# Patient Record
Sex: MALE | Race: OTHER | HISPANIC OR LATINO | URBAN - METROPOLITAN AREA
[De-identification: names, ages, dates, MRNs, and addresses within clinical notes are randomized per-mention and may not be internally consistent; named-entity substitution may affect disease eponyms.]

---

## 2022-06-15 ENCOUNTER — HOSPITAL ENCOUNTER (EMERGENCY)
Facility: HOSPITAL | Age: 37
Discharge: HOME | End: 2022-06-15
Attending: STUDENT IN AN ORGANIZED HEALTH CARE EDUCATION/TRAINING PROGRAM

## 2022-06-15 ENCOUNTER — CLINICAL SUPPORT (OUTPATIENT)
Dept: OPHTHALMOLOGY | Facility: CLINIC | Age: 37
End: 2022-06-15
Attending: STUDENT IN AN ORGANIZED HEALTH CARE EDUCATION/TRAINING PROGRAM

## 2022-06-15 VITALS
HEART RATE: 64 BPM | RESPIRATION RATE: 16 BRPM | SYSTOLIC BLOOD PRESSURE: 127 MMHG | OXYGEN SATURATION: 100 % | DIASTOLIC BLOOD PRESSURE: 77 MMHG | TEMPERATURE: 98 F | HEIGHT: 65 IN

## 2022-06-15 VITALS — HEIGHT: 64 IN

## 2022-06-15 DIAGNOSIS — H20.9 TRAUMATIC IRITIS: ICD-10-CM

## 2022-06-15 DIAGNOSIS — T15.92XA FOREIGN BODY, EYE, LEFT, INITIAL ENCOUNTER: Primary | ICD-10-CM

## 2022-06-15 DIAGNOSIS — H11.003 PTERYGIUM EYE, BILATERAL: ICD-10-CM

## 2022-06-15 DIAGNOSIS — T15.02XA FOREIGN BODY OF LEFT CORNEA, INITIAL ENCOUNTER: Primary | ICD-10-CM

## 2022-06-15 PROCEDURE — 99284 EMERGENCY DEPT VISIT MOD MDM: CPT | Mod: 27

## 2022-06-15 PROCEDURE — 63600175 PHARM REV CODE 636 W HCPCS: Performed by: PHYSICIAN ASSISTANT

## 2022-06-15 PROCEDURE — 90715 TDAP VACCINE 7 YRS/> IM: CPT | Performed by: PHYSICIAN ASSISTANT

## 2022-06-15 PROCEDURE — 25000003 PHARM REV CODE 250: Performed by: STUDENT IN AN ORGANIZED HEALTH CARE EDUCATION/TRAINING PROGRAM

## 2022-06-15 PROCEDURE — 99213 OFFICE O/P EST LOW 20 MIN: CPT | Mod: PBBFAC | Performed by: STUDENT IN AN ORGANIZED HEALTH CARE EDUCATION/TRAINING PROGRAM

## 2022-06-15 PROCEDURE — 65222 REMOVE FOREIGN BODY FROM EYE: CPT | Mod: PBBFAC | Performed by: STUDENT IN AN ORGANIZED HEALTH CARE EDUCATION/TRAINING PROGRAM

## 2022-06-15 PROCEDURE — 90471 IMMUNIZATION ADMIN: CPT | Performed by: PHYSICIAN ASSISTANT

## 2022-06-15 RX ORDER — TETRACAINE HYDROCHLORIDE 5 MG/ML
2 SOLUTION OPHTHALMIC
Status: DISCONTINUED | OUTPATIENT
Start: 2022-06-15 | End: 2022-06-15 | Stop reason: HOSPADM

## 2022-06-15 RX ORDER — ATROPINE SULFATE 10 MG/ML
1 SOLUTION/ DROPS OPHTHALMIC 2 TIMES DAILY
Qty: 2 ML | Refills: 0 | Status: SHIPPED | OUTPATIENT
Start: 2022-06-15 | End: 2022-06-22

## 2022-06-15 RX ORDER — ERYTHROMYCIN 5 MG/G
OINTMENT OPHTHALMIC NIGHTLY
Qty: 3.5 G | Refills: 0 | Status: SHIPPED | OUTPATIENT
Start: 2022-06-15 | End: 2022-06-22

## 2022-06-15 RX ORDER — MOXIFLOXACIN 5 MG/ML
1 SOLUTION/ DROPS OPHTHALMIC 3 TIMES DAILY
Qty: 3 ML | Refills: 0 | Status: SHIPPED | OUTPATIENT
Start: 2022-06-15 | End: 2022-06-22

## 2022-06-15 RX ADMIN — TETANUS TOXOID, REDUCED DIPHTHERIA TOXOID AND ACELLULAR PERTUSSIS VACCINE, ADSORBED 0.5 ML: 5; 2.5; 8; 8; 2.5 SUSPENSION INTRAMUSCULAR at 10:06

## 2022-06-15 NOTE — PROGRESS NOTES
Assessment/Plan:  1. Corneal foreign body, OS  - 0.9 x 0.9 mm foreign body in central visual axis  - Informed consent signed, removed at slit lamp but still with residual rust ring  - Start vigamox 4x daily in left eye only  - Start erythromycin ointment at night  - Discussed importance of wearing safety glasses when working. Patient has evidence of prior K foreign body in OD  - Repeat K check next week, clean up rust ring if moved more anterior    2. Traumatic iritis, OS  - Start atropine twice daily left eye only  - Pred forte if needed after #1 resolved and epi defect healed    2. Pterygium OU  - Unlikely visually significant, Mrx after #1 and #2 resolved    RTC Monday for repeat K check and AC check

## 2022-06-15 NOTE — ED PROVIDER NOTES
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  CO  LT EYE PAIN AND REDNESS.      Patient reports left eye pain and irritation that started after a piece of nail he was hammering hit him in the eye while at work    The history is provided by the patient.   Foreign Body in Eye  The current episode started more than 2 days ago. The problem occurs constantly. The problem has been gradually worsening. Pertinent negatives include no chest pain, no abdominal pain and no shortness of breath. He has tried nothing for the symptoms.     Review of patient's allergies indicates:  No Known Allergies  No past medical history on file.  History reviewed. No pertinent surgical history.  Family History   Problem Relation Age of Onset    No Known Problems Mother     No Known Problems Father     No Known Problems Sister     No Known Problems Brother     No Known Problems Maternal Aunt     No Known Problems Maternal Uncle     No Known Problems Paternal Aunt     Diabetes Paternal Uncle     No Known Problems Maternal Grandmother     No Known Problems Maternal Grandfather     No Known Problems Paternal Grandmother     No Known Problems Paternal Grandfather     Amblyopia Neg Hx     Blindness Neg Hx     Cancer Neg Hx     Cataracts Neg Hx     Glaucoma Neg Hx     Hypertension Neg Hx     Macular degeneration Neg Hx     Retinal detachment Neg Hx     Strabismus Neg Hx     Stroke Neg Hx     Thyroid disease Neg Hx      Social History     Tobacco Use    Smoking status: Never Smoker    Smokeless tobacco: Never Used   Substance Use Topics    Alcohol use: Yes     Alcohol/week: 1.0 standard drink     Types: 1 Cans of beer per week    Drug use: Never     Review of Systems   Constitutional: Negative for fever.   HENT: Negative for sore throat.    Eyes: Positive for pain, redness and itching.    Respiratory: Negative for shortness of breath.    Cardiovascular: Negative for chest pain.   Gastrointestinal: Negative for abdominal pain and nausea.   Genitourinary: Negative for dysuria.   Musculoskeletal: Negative for back pain.   Skin: Negative for rash.   Neurological: Negative for weakness.   Hematological: Does not bruise/bleed easily.   Psychiatric/Behavioral: Negative.        Physical Exam     Initial Vitals [06/15/22 0905]   BP Pulse Resp Temp SpO2   127/77 64 16 97.9 °F (36.6 °C) 100 %      MAP       --         Physical Exam    Nursing note and vitals reviewed.  Constitutional: He appears well-developed.   HENT:   Head: Atraumatic.   Eyes: EOM and lids are normal. Pupils are equal, round, and reactive to light. Foreign body present in the left eye. Left conjunctiva is injected.   Neck:   Normal range of motion.  Cardiovascular: Normal rate and regular rhythm.   Pulmonary/Chest: Breath sounds normal. He exhibits no tenderness.   Abdominal: Abdomen is soft. Bowel sounds are normal. There is no abdominal tenderness.   Musculoskeletal:         General: Normal range of motion.      Cervical back: Normal range of motion.     Neurological: He is alert and oriented to person, place, and time.   Skin: Skin is warm. No pallor.   Psychiatric: He has a normal mood and affect. His behavior is normal. Judgment and thought content normal.         ED Course   Procedures  Labs Reviewed - No data to display       Imaging Results    None          Medications   fluorescein ophthalmic strip 1 each (has no administration in time range)   TETRAcaine HCl (PF) 0.5 % Drop 2 drop (has no administration in time range)   Tdap (BOOSTRIX) vaccine injection 0.5 mL (0.5 mLs Intramuscular Given 6/15/22 1049)                 ED Course as of 06/15/22 1301   Wed Tadeo 15, 2022   1056 Talked to Dr Andrew (optho on call) will see patient in clinic immediately after discharge [AL]      ED Course User Index  [AL] JAGUAR Antonio              Clinical Impression:   Final diagnoses:  [T15.92XA] Foreign body, eye, left, initial encounter (Primary)          ED Disposition Condition    Discharge Stable        ED Prescriptions     None        Follow-up Information     Follow up With Specialties Details Why Contact Info    discharge followup    If your symptoms become WORSE or you DO NOT IMPROVE and you are unable to reach your health care provider, you should RETURN to the emergency department    discharge info    Discussed all pertinent ED information, results, diagnosis and treatment plan; All questions and concerns were addressed at this time. Patient voices understanding of information and instructions. Patient is comfortable with plan and discharge           JAGUAR Antonio  06/15/22 [id number]

## 2022-06-20 ENCOUNTER — OFFICE VISIT (OUTPATIENT)
Dept: OPHTHALMOLOGY | Facility: CLINIC | Age: 37
End: 2022-06-20

## 2022-06-20 VITALS — HEIGHT: 64 IN

## 2022-06-20 DIAGNOSIS — T15.02XD FOREIGN BODY OF LEFT CORNEA, SUBSEQUENT ENCOUNTER: Primary | ICD-10-CM

## 2022-06-20 PROCEDURE — 99213 OFFICE O/P EST LOW 20 MIN: CPT | Mod: PBBFAC,PO | Performed by: STUDENT IN AN ORGANIZED HEALTH CARE EDUCATION/TRAINING PROGRAM

## 2022-06-20 NOTE — PROGRESS NOTES
Assessment/Plan:  1. Corneal foreign body, OS  - Informed consent signed residual rust ring removed at slit lamp with rust ring, residual 60% corneal thickness  - Continue vigamox 4x daily in left eye only  - Pt unable to obtain erythromycin ointment, OK to hold off  - Discussed importance of wearing safety glasses when working. Patient has evidence of prior K foreign body in OD  - Repeat K check next week    2. Traumatic iritis, OS  - Continue atropine twice daily left eye only  - Pred forte if needed after #1 resolved and epi defect healed    2. Pterygium OU  - Unlikely visually significant, Mrx after #1 and #2 resolved    RTC Monday for repeat K check and AC check

## 2022-06-21 PROBLEM — T15.02XA FOREIGN BODY OF LEFT CORNEA: Status: ACTIVE | Noted: 2022-06-21
